# Patient Record
Sex: FEMALE | Race: WHITE | Employment: UNEMPLOYED | URBAN - METROPOLITAN AREA
[De-identification: names, ages, dates, MRNs, and addresses within clinical notes are randomized per-mention and may not be internally consistent; named-entity substitution may affect disease eponyms.]

---

## 2024-07-25 ENCOUNTER — OFFICE VISIT (OUTPATIENT)
Age: 39
End: 2024-07-25

## 2024-07-25 VITALS
HEART RATE: 80 BPM | WEIGHT: 155 LBS | DIASTOLIC BLOOD PRESSURE: 77 MMHG | OXYGEN SATURATION: 97 % | TEMPERATURE: 98.2 F | BODY MASS INDEX: 24.91 KG/M2 | HEIGHT: 66 IN | SYSTOLIC BLOOD PRESSURE: 110 MMHG

## 2024-07-25 DIAGNOSIS — M25.531 RIGHT WRIST PAIN: Primary | ICD-10-CM

## 2024-07-25 DIAGNOSIS — M77.8 RIGHT WRIST TENDONITIS: ICD-10-CM

## 2024-07-25 RX ORDER — NAPROXEN 500 MG/1
500 TABLET ORAL 2 TIMES DAILY WITH MEALS
Qty: 10 TABLET | Refills: 0 | Status: SHIPPED | OUTPATIENT
Start: 2024-07-25 | End: 2024-07-30

## 2024-07-25 NOTE — PROGRESS NOTES
Mansi Carrington (:  1985) is a 38 y.o. female,  here for evaluation of the following chief complaint(s): Wrist Pain (Right wrist; pain for a week/Doesn't remember injuring herself)    Mansi Carrington is a New patient.   I have reviewed the patient's medications; see Medication Reconciliation.      ASSESSMENT/PLAN:  Diagnosis:     ICD-10-CM    1. Right wrist pain  M25.531 XR WRIST RIGHT (MIN 3 VIEWS)     [facility]      2. Right wrist tendonitis  M77.8 [facility]     [facility] Wrist Brace, Right            Patient was seen at Urgent Care today for right wrist pain x 1 week(s).   Patient did not report injury: She also works as a  and denies overuse.     On physical exam there is no deformity noted. There is moderate tenderness to the distal ulna and lunate.  No snuff box tenderness. Hand and wrist are neurovascularly intact.   Mildly positive reverse phalen's test.     Based on history and physical examination, differential diagnosis includes but is not limited to: Fracture, sprain, contusion, tendonitis, overuse injury.   There is no erythema, warmth, or induration to suggest infectious process. I do not have concern for septic joint.     Xray was obtained to evaluate for above differential and was initially interpreted by me for a wet read while awaiting official radiologist report. Based on my initial interpretation Xray showed: no acute fracture    Radiologist read: No acute abnormalities.     I suspect this may be more tendonitis.   Wrist brace is applied.  Advised symptomatic care with application of ice and use of OTC analgesics.   Prescribed: Naproxen and prednisone.     Pt was referred to Orthopaedics for additional evaluation and management.        Patient did not have elevated blood pressure greater than 120/80. Therefore, referral to PCP for HTN is not indicated      Results:  Results for 
,DirectAddress_Unknown

## 2024-07-25 NOTE — PATIENT INSTRUCTIONS
Wrist brace as recommended.     Naproxen as prescribed as needed.     Apply ice to the affected area x 20 minutes several times a day. (20 minutes on followed by 20 minutes off).     Follow-up with orthopeadics.

## 2025-04-21 ENCOUNTER — OFFICE VISIT (OUTPATIENT)
Age: 40
End: 2025-04-21

## 2025-04-21 VITALS
DIASTOLIC BLOOD PRESSURE: 70 MMHG | SYSTOLIC BLOOD PRESSURE: 112 MMHG | WEIGHT: 155 LBS | OXYGEN SATURATION: 99 % | TEMPERATURE: 97.6 F | HEIGHT: 66 IN | BODY MASS INDEX: 24.91 KG/M2 | HEART RATE: 76 BPM

## 2025-04-21 DIAGNOSIS — S39.012A STRAIN OF LUMBAR REGION, INITIAL ENCOUNTER: Primary | ICD-10-CM

## 2025-04-21 RX ORDER — METHYLPREDNISOLONE 4 MG/1
TABLET ORAL
Qty: 1 KIT | Refills: 0 | Status: SHIPPED | OUTPATIENT
Start: 2025-04-21 | End: 2025-04-27

## 2025-04-21 RX ORDER — CYCLOBENZAPRINE HCL 10 MG
10 TABLET ORAL NIGHTLY PRN
Qty: 7 TABLET | Refills: 0 | Status: SHIPPED | OUTPATIENT
Start: 2025-04-21

## 2025-04-21 NOTE — PATIENT INSTRUCTIONS
Do not take advil-motrin or aleve or any other NSAID if prescribed steroid/prednisone  Can take tylenol if felt needed along with oral steroid medication  Be advised that steroids can increase blood sugar so if diabetic-monitor your sugars  Steroid can increase BP  Muscle relaxer if prescribed can cause drowsiness -can take at night., so advised not to take prior to driving or working  Can use over the counter topical pain patches (salonpas)  Go to the ER if increasing pain-fever-weakness /numbness-pain radiating into into extremities,   Go to ER if unable to point toes/foot upward or if loss of bladder control  Call tomorrow and schedule follow up appointment to be seen  with PCP  --- follow up in 7-10 days  if no improvement and still having pain

## 2025-04-21 NOTE — PROGRESS NOTES
Mansi Carrington (:  1985) is a 39 y.o. female,Established patient, here for evaluation of the following chief complaint(s):  Fall (Fell up steps last Tues. Then yesterday moved bed and lower back popped.)      ASSESSMENT/PLAN:    ICD-10-CM    1. Strain of lumbar region, initial encounter  S39.012A XR LUMBAR SPINE (2-3 VIEWS)     cyclobenzaprine (FLEXERIL) 10 MG tablet     methylPREDNISolone (MEDROL DOSEPACK) 4 MG tablet        Tuesday,  was walking up steps, foot slipped she twist lower back trying to catch herself from falling into steps   Then this past  lifted bed-and felt increased lower back pain and heard a pop    pain radiates to upper thighs only- no loss of bladder control.... tried tylen/ibuprofen 800 no relief     Radiologist report pending....   my preliminary report: (-) for fracture or dislocation      Do not take advil-motrin or aleve or any other NSAID if prescribed steroid/prednisone  Can take tylenol if felt needed along with oral steroid medication  Be advised that steroids can increase blood sugar so if diabetic-monitor your sugars  Steroid can increase BP  Muscle relaxer if prescribed can cause drowsiness -can take at night., so advised not to take prior to driving or working  Can use over the counter topical pain patches (salonpas)  Go to the ER if increasing pain-fever-weakness /numbness-pain radiating into into extremities,   Go to ER if unable to point toes/foot upward or if loss of bladder control  Call tomorrow and schedule follow up appointment to be seen  with PCP  --- follow up in 7-10 days  if no improvement and still having pain    ======================================================================  FINDINGS:  Lumbar vertebral bodies are normal in height and alignment.   No evidence of  fracture. Visualized sacrum is unremarkable.  No significant degenerative changes.  IMPRESSION:  Unremarkable examination of the lumbar spine.